# Patient Record
Sex: FEMALE | Race: BLACK OR AFRICAN AMERICAN | NOT HISPANIC OR LATINO | URBAN - METROPOLITAN AREA
[De-identification: names, ages, dates, MRNs, and addresses within clinical notes are randomized per-mention and may not be internally consistent; named-entity substitution may affect disease eponyms.]

---

## 2020-07-09 ENCOUNTER — OFFICE VISIT (OUTPATIENT)
Dept: URBAN - METROPOLITAN AREA CLINIC 86 | Facility: CLINIC | Age: 70
End: 2020-07-09

## 2022-09-09 ENCOUNTER — TELEPHONE ENCOUNTER (OUTPATIENT)
Dept: URBAN - METROPOLITAN AREA CLINIC 92 | Facility: CLINIC | Age: 72
End: 2022-09-09

## 2022-09-09 PROBLEM — 413467001 AFTERCARE: Status: ACTIVE | Noted: 2022-09-09

## 2022-09-09 NOTE — HPI-TODAY'S VISIT:
Patient is a 71-year-old female former patient of Dr. Gale and last seen by her on January 16, 2020 for fatty liver and now comes in to be seen. Patient at that time had lost some weight and had been cutting out breads and carbohydrates and doing Silver sneakers. Patient had a myocardial infarction and PCI in March 2019. Patient listed as having had hepatitis AMB immunizations. Weight was listed as being down from 216 pounds in June 2019 to 206 pounds in January 16, 2020. Per the notes the patient previously had hepatitis C and was biopsied in 2015 noted to have stage III fibrosis and 20% fat.  Was successfully treated with Harvoni at that timeframe. Patient also with history of AFP elevation in the past but no tumor seen on MRI in 2018 or subsequent ultrasound.  aFP had been ordered for follow-up then in June 2020. Patient also listed with diabetes history and seeing local providers. Patient taking aspirin Plavix and a statin for her coronary disease and had a percutaneous coronary convention in the past. Patient previously noted to be immune to hepatitis a as well as hepatitis B.  Patient wants to follow-up in 6 months but lost to follow-up. Normally follows with Dr. Basilia Ray for general care. January 2020 labs show glucose 121 BUN of 20 creatinine 0.93 sodium 141 potassium 4.2 calcium 10.1 albumin 4.1 bilirubin 0.3 alkaline phosphatase 48 AST 18 ALT of 12 white blood cell count 4.6 hemoglobin 13.6 plate count 359 MCV 84 INR 1.0 AFP normal at 6.3. January 16, 2020 labs showed liver within normal limits and no focal lesions.  Gallbladder absent.  Bile ducts not dilated.  Common bile duct 4 mm.  Image pancreas portions unremarkable.  Spleen not enlarged.  Right kidney 11.2 cm.  No ascites.  They felt the exam was within normal limits. June 8, 2018 MRI at shown heart mildly enlarged but no pleural or pericardial fusion.  Mild liver fat at 10% then.  No sequela chronic liver disease report hypertension seen.  Gallbladder/biliary tree, spleen, pancreas, adrenal glands, kidneys, lymph nodes and vessels were all normal.  Overall the patient was felt to have mild fat with no chronic liver disease. 1.  CASTRO history noted by biopsy 20% fat stage III fibrosis but also had hep C.  Hep C was treated and does not 15 with Harvoni.  Last MRI in 2018 not showing any signs of residual disease.  Last imaging January 2020 also being unremarkable.  Liver labs normal then.  Needs updated labs and imaging.  Patient has been losing weight last visit need to see how she is doing and monitor accordingly. 2.  Chronic hepatitis C history noted treated with Harvoni thousand 13 previously stage III fibrosis 20% fat and doing well there. 3.  Hepatic fibrosis stage III before but now appears to be resolved on same by MRI and ultrasound reported normal appearance.  Could consider FibroScan to assess. 4.  Vaccine counseling previously vaccine for hepatitis AMB and reported to be immune.  Would like to confirm same. 5.  Diabetes history noted follows with Dr. Basilia Ray for same.  Important to maintain same given the history of these other issues. 6.  Hypertension history noted and follows with primary provider regarding same. Plan 1.  _update labs. 2.  _update imaging. 3.  Encourage patient to continue to work on fatty liver risk factors.   Stressed to pt the need for social distancing and strict handwashing and wearing a mask and to follow any other new or added CDC recommendations as this is an evolving target.  Duration of the visit was 0 min with 20 min of chart prep reviewing data and infDuration of the visit was 0 minutes with 20 minutes of chart prep and 20 minutes by dox video and then by phone due to internet issues by phone for this TeleMed visit with time reviewing their prior and recent records and labs and discussing their current status and future plans for care for the patient.

## 2022-09-21 ENCOUNTER — LAB OUTSIDE AN ENCOUNTER (OUTPATIENT)
Dept: URBAN - METROPOLITAN AREA CLINIC 86 | Facility: CLINIC | Age: 72
End: 2022-09-21

## 2022-09-21 ENCOUNTER — OFFICE VISIT (OUTPATIENT)
Dept: URBAN - METROPOLITAN AREA CLINIC 86 | Facility: CLINIC | Age: 72
End: 2022-09-21
Payer: COMMERCIAL

## 2022-09-21 VITALS
DIASTOLIC BLOOD PRESSURE: 82 MMHG | BODY MASS INDEX: 35.68 KG/M2 | TEMPERATURE: 97.3 F | HEART RATE: 77 BPM | WEIGHT: 222 LBS | SYSTOLIC BLOOD PRESSURE: 153 MMHG | HEIGHT: 66 IN

## 2022-09-21 DIAGNOSIS — E66.9 OBESITY: ICD-10-CM

## 2022-09-21 DIAGNOSIS — B18.2 CHRONIC HEPATITIS C: ICD-10-CM

## 2022-09-21 DIAGNOSIS — Z95.5 HISTORY OF CORONARY ANGIOPLASTY WITH INSERTION OF STENT: ICD-10-CM

## 2022-09-21 DIAGNOSIS — E11.9 DIABETES: ICD-10-CM

## 2022-09-21 DIAGNOSIS — I10 HTN: ICD-10-CM

## 2022-09-21 DIAGNOSIS — Z71.89 VACCINE COUNSELING: ICD-10-CM

## 2022-09-21 DIAGNOSIS — K75.81 NASH (NONALCOHOLIC STEATOHEPATITIS): ICD-10-CM

## 2022-09-21 DIAGNOSIS — E78.5 HYPERLIPIDEMIA: ICD-10-CM

## 2022-09-21 DIAGNOSIS — K74.00 HEPATIC FIBROSIS: ICD-10-CM

## 2022-09-21 PROCEDURE — 99215 OFFICE O/P EST HI 40 MIN: CPT

## 2022-09-21 RX ORDER — ROSUVASTATIN CALCIUM 10 MG/1
1 TABLET TABLET, FILM COATED ORAL ONCE A DAY
Status: ACTIVE | COMMUNITY

## 2022-09-21 RX ORDER — METFORMIN HYDROCHLORIDE 500 MG/1
1 TABLET WITH A MEAL TABLET, FILM COATED ORAL BID
Status: ACTIVE | COMMUNITY

## 2022-09-21 RX ORDER — AMLODIPINE BESYLATE 5 MG/1
1 TABLET TABLET ORAL ONCE A DAY
Status: ACTIVE | COMMUNITY

## 2022-09-21 RX ORDER — LOSARTAN POTASSIUM 25 MG/1
1 TABLET TABLET ORAL ONCE A DAY
Status: ACTIVE | COMMUNITY

## 2022-09-21 RX ORDER — HYDROCHLOROTHIAZIDE 12.5 MG/1
1 TABLET IN THE MORNING TABLET ORAL ONCE A DAY
Refills: 0 | Status: ACTIVE | COMMUNITY
Start: 1900-01-01

## 2022-09-21 NOTE — EXAM-PHYSICAL EXAM
General: pleasant, well developed, well nourished, no acute distress, non ill appearing Eyes- no scleral icterus HENT: normocephalic, atraumatic head, face mask covering mouth and nose Neck: supple, no JVD Chest: normal breath sounds, normal work of breathing Heart: regular rate and rhythm without murmur Abdomen: soft, non tender, non distended, obese,  bowel sounds present, no hepatomegaly, no splenomegaly, no ascites Musculoskeletal: normal gait and station Extremities: no edema, no asterixis, mild palmar erythema Skin: no rashes, no jaundice Neurologic: no focal deficits, alert and oriented x 3 Psychiatric: stable mood, appropriate affect

## 2022-09-21 NOTE — HPI-TODAY'S VISIT:
Patient is a 71-year-old female former patient of Dr. Gale and prior Dr Gitlin and last seen by her on January 16, 2020 for fatty liver and now comes in to be seen.  I reveiwed the prior records that were available. At that time: patient at that time had lost some weight and had been cutting out breads and carbohydrates and doing Silver sneakers. Since the pandemic and stopped the exercise center for [facility] and did what could and gained weight. Knee pain led to less walking and gained from 207 and now to 227.  Patient had a myocardial infarction and PCI in March 2019. She is well with that.  Patient listed as having had hepatitis A/B immunizations.  Weight was listed as being down from 216 pounds in June 2019 to 206-207 pounds in January 16, 2020.  Per the notes the patient previously had hepatitis C and was biopsied in 2015 noted to have stage III fibrosis and 20% fat.  Was successfully treated with Harvoni at that timeframe.  Patient also with history of AFP elevation in the past but no tumor seen on MRI in 2018 or subsequent ultrasound.  aFP had been ordered for follow-up then in June 2020.  Patient also listed with diabetes history and seeing local providers. She says that dm is stable and last a1c 6.7%.  Patient taking aspirin, a statin for her coronary disease and had a percutaneous coronary convention in the past.  Patient wants to follow-up in 6 months but lost to follow-up due to pandemic.  Normally follows with Dr. Basilia Ray for general care.  January 2020 labs show glucose 121 BUN of 20 creatinine 0.93 sodium 141 potassium 4.2 calcium 10.1 albumin 4.1 bilirubin 0.3 alkaline phosphatase 48 AST 18 ALT of 12 white blood cell count 4.6 hemoglobin 13.6 plate count 359 MCV 84 INR 1.0 AFP normal at 6.3.  January 16, 2020 labs showed liver within normal limits and no focal lesions.  Gallbladder absent.  Bile ducts not dilated.  Common bile duct 4 mm.  Image pancreas portions unremarkable.  Spleen not enlarged.  Right kidney 11.2 cm.  No ascites.  They felt the exam was within normal limits.  June 8, 2018 MRI at shown heart mildly enlarged but no pleural or pericardial fusion.  Mild liver fat at 10% then.  No sequela chronic liver disease report hypertension seen.  Gallbladder/biliary tree, spleen, pancreas, adrenal glands, kidneys, lymph nodes and vessels were all normal.  Overall the patient was felt to have mild fat with no chronic liver disease.  Right now behind on labs and imaging and need to get caught. She may need [facility] dietitician and get counseling for dm and weight loss and get back on track as suspect some of gains of 2020 will be loss.  Plan 1.  We will update labs. 2.  We will need to update imaging. 3.  Encourage patient to continue to work on fatty liver risk factors with Dr Ray. 4. RTC in 3 m to reassess status.   Stressed to pt the need for social distancing and strict handwashing and wearing a mask and to follow any other new or added CDC recommendations as this is an evolving target.   Duration of the visit was 45 min with 20 min of chart prep reviewing data and then 25 minutes for this face to face visit with time reviewing their prior and recent records and labs and discussing their current status and future plans for care for the patient.

## 2022-09-23 ENCOUNTER — OFFICE VISIT (OUTPATIENT)
Dept: URBAN - METROPOLITAN AREA CLINIC 91 | Facility: CLINIC | Age: 72
End: 2022-09-23
Payer: COMMERCIAL

## 2022-09-23 ENCOUNTER — TELEPHONE ENCOUNTER (OUTPATIENT)
Dept: URBAN - METROPOLITAN AREA CLINIC 92 | Facility: CLINIC | Age: 72
End: 2022-09-23

## 2022-09-23 DIAGNOSIS — K75.81 NASH (NONALCOHOLIC STEATOHEPATITIS): ICD-10-CM

## 2022-09-23 LAB
A/G RATIO: 1.2
ABSOLUTE BASOPHILS: 29
ABSOLUTE EOSINOPHILS: 99
ABSOLUTE LYMPHOCYTES: 1676
ABSOLUTE MONOCYTES: 331
ABSOLUTE NEUTROPHILS: 3666
ALBUMIN: 3.8
ALKALINE PHOSPHATASE: 67
ALT (SGPT): 8
AST (SGOT): 10
BASOPHILS: 0.5
BILIRUBIN, TOTAL: 0.3
BUN/CREATININE RATIO: (no result)
BUN: 18
CALCIUM: 9.6
CARBON DIOXIDE, TOTAL: 25
CHLORIDE: 108
CREATININE: 0.67
EGFR: 93
EOSINOPHILS: 1.7
GLOBULIN, TOTAL: 3.2
GLUCOSE: 127
HCV RNA, QUANTITATIVE: <1.18
HCV RNA, QUANTITATIVE: <15
HEMATOCRIT: 41.5
HEMOGLOBIN: 13.6
HEP A AB, IGM: (no result)
HEPATITIS A AB, TOTAL: REACTIVE
HEPATITIS B SURFACE AB IMMUNITY, QN: 63
LYMPHOCYTES: 28.9
MCH: 27.3
MCHC: 32.8
MCV: 83.2
MONOCYTES: 5.7
MPV: 10.3
NEUTROPHILS: 63.2
PLATELET COUNT: 337
POTASSIUM: 3.8
PROTEIN, TOTAL: 7
RDW: 14
RED BLOOD CELL COUNT: 4.99
SODIUM: 143
WHITE BLOOD CELL COUNT: 5.8

## 2022-09-23 PROCEDURE — 76705 ECHO EXAM OF ABDOMEN: CPT

## 2022-09-23 PROCEDURE — 93975 VASCULAR STUDY: CPT

## 2022-09-23 NOTE — HPI-TODAY'S VISIT:
Dear Kendy Lee, September 21 labs show hepatitis A IgM nonreactive.  Hepatitis A total immunity was seen indicating that this immunity was not due to a recent exposure or vaccination related event. Do you recall getting the vaccine in the past? You also do have immunity to hepatitis B at a level of 63 so that is good to note. Hepatitis C PCR less than 15 and not mentioned as being detected. Glucose was 127 slightly up and unclear if you were fasting this day or not?  BUN of 18 creatinine 0.67 sodium 143 potassium 3.8 calcium 9.6 albumin 3.8 total bilirubin 0.3 alkaline phosphatase 67 AST of 10 and ALT of 8.  Ideal ALT less than 25. White blood cell count 5.8 hemoglobin 13.6 platelet count 337 and MCV 83.2 neutrophils 3666 and lymphocytes 1676. These labs confirm that your hepatitis C remains in remission. Your liver labs also are remaining at ideal. We await the updated imaging. The labs also confirmed that you are hepatitis A as well as hepatitis B immune at this timeframe and that is good to note. Dr. Sarmiento

## 2022-09-24 ENCOUNTER — TELEPHONE ENCOUNTER (OUTPATIENT)
Dept: URBAN - METROPOLITAN AREA CLINIC 92 | Facility: CLINIC | Age: 72
End: 2022-09-24

## 2022-09-24 NOTE — HPI-TODAY'S VISIT:
Dear Kendy Lee, Sept 23 u.s:  pancreas unremarkable. Liver somewhat echogenic with no discrete liver lesion. Common bile duct was 7 mm which is upper normal. Liver vessels were patent. Right kidney 11.3 cm with no hydronephrosis. Spleen normal at 8.1 cm. They overall felt that the liver was somewhat echogenic and possibly mildy fatty infiltrated. As you recall from your visit, on one of your last scans in 2020 it had showed the liver then was within normal limits but the MRI in 2018 had suggested 10% fat being present. You also mentioned at the visit that you had had some issues with weight gain with the pandemic. We anticipate that as that gets back on track, that this should also improve. We will plan to repeat the imaging again in 6 months and reassess. Dr. Sarmiento

## 2022-12-07 ENCOUNTER — DASHBOARD ENCOUNTERS (OUTPATIENT)
Age: 72
End: 2022-12-07

## 2022-12-07 ENCOUNTER — WEB ENCOUNTER (OUTPATIENT)
Dept: URBAN - METROPOLITAN AREA CLINIC 86 | Facility: CLINIC | Age: 72
End: 2022-12-07

## 2022-12-07 ENCOUNTER — OFFICE VISIT (OUTPATIENT)
Dept: URBAN - METROPOLITAN AREA CLINIC 86 | Facility: CLINIC | Age: 72
End: 2022-12-07
Payer: COMMERCIAL

## 2022-12-07 ENCOUNTER — LAB OUTSIDE AN ENCOUNTER (OUTPATIENT)
Dept: URBAN - METROPOLITAN AREA CLINIC 86 | Facility: CLINIC | Age: 72
End: 2022-12-07

## 2022-12-07 VITALS
HEART RATE: 65 BPM | HEIGHT: 66 IN | BODY MASS INDEX: 36.24 KG/M2 | SYSTOLIC BLOOD PRESSURE: 150 MMHG | DIASTOLIC BLOOD PRESSURE: 84 MMHG | TEMPERATURE: 98.2 F | WEIGHT: 225.5 LBS

## 2022-12-07 DIAGNOSIS — Z95.5 HISTORY OF CORONARY ANGIOPLASTY WITH INSERTION OF STENT: ICD-10-CM

## 2022-12-07 DIAGNOSIS — E66.9 OBESITY: ICD-10-CM

## 2022-12-07 DIAGNOSIS — K74.00 HEPATIC FIBROSIS: ICD-10-CM

## 2022-12-07 DIAGNOSIS — K75.81 NASH (NONALCOHOLIC STEATOHEPATITIS): ICD-10-CM

## 2022-12-07 DIAGNOSIS — E11.9 DIABETES: ICD-10-CM

## 2022-12-07 DIAGNOSIS — B18.2 CHRONIC HEPATITIS C: ICD-10-CM

## 2022-12-07 DIAGNOSIS — I10 HTN: ICD-10-CM

## 2022-12-07 DIAGNOSIS — E78.5 HYPERLIPIDEMIA: ICD-10-CM

## 2022-12-07 DIAGNOSIS — Z71.89 VACCINE COUNSELING: ICD-10-CM

## 2022-12-07 PROBLEM — 55822004 HYPERLIPIDEMIA: Status: ACTIVE | Noted: 2022-09-21

## 2022-12-07 PROBLEM — 38341003 HYPERTENSION: Status: ACTIVE | Noted: 2022-09-09

## 2022-12-07 PROBLEM — 128302006 CHRONIC HEPATITIS C: Status: ACTIVE | Noted: 2022-09-09

## 2022-12-07 PROBLEM — 414916001 OBESITY: Status: ACTIVE | Noted: 2022-09-21

## 2022-12-07 PROBLEM — 373108000 POST PERCUTANEOUS TRANSLUMINAL CORONARY ANGIOPLASTY: Status: ACTIVE | Noted: 2022-09-21

## 2022-12-07 PROBLEM — 442685003 NASH - NONALCOHOLIC STEATOHEPATITIS: Status: ACTIVE | Noted: 2022-09-09

## 2022-12-07 PROBLEM — 111552007 DIABETES MELLITUS WITHOUT COMPLICATION: Status: ACTIVE | Noted: 2022-09-09

## 2022-12-07 PROBLEM — 409063005 COUNSELING: Status: ACTIVE | Noted: 2022-09-09

## 2022-12-07 PROBLEM — 62484002 HEPATIC FIBROSIS: Status: ACTIVE | Noted: 2022-09-09

## 2022-12-07 PROCEDURE — 99214 OFFICE O/P EST MOD 30 MIN: CPT

## 2022-12-07 RX ORDER — METFORMIN HYDROCHLORIDE 500 MG/1
1 TABLET WITH A MEAL TABLET, FILM COATED ORAL BID
Status: ACTIVE | COMMUNITY

## 2022-12-07 RX ORDER — HYDROCHLOROTHIAZIDE 12.5 MG/1
1 TABLET IN THE MORNING TABLET ORAL ONCE A DAY
Refills: 0 | Status: ACTIVE | COMMUNITY
Start: 1900-01-01

## 2022-12-07 RX ORDER — ROSUVASTATIN CALCIUM 10 MG/1
1 TABLET TABLET, FILM COATED ORAL ONCE A DAY
Status: ACTIVE | COMMUNITY

## 2022-12-07 RX ORDER — AMLODIPINE BESYLATE 5 MG/1
1 TABLET TABLET ORAL ONCE A DAY
Status: ACTIVE | COMMUNITY

## 2022-12-07 RX ORDER — LOSARTAN POTASSIUM 25 MG/1
1 TABLET TABLET ORAL ONCE A DAY
Status: ACTIVE | COMMUNITY

## 2022-12-07 NOTE — HPI-TODAY'S VISIT:
Patient is a 72-year-old female former patient of Dr. Gale and prior Dr Gitlin and last seen sept 2022 for fatty liver and prior hcv tx with Marty and now comes in to be seen.  I reveiwed the prior records that were available.  Sept 23 u.s:  pancreas unremarkable. Liver somewhat echogenic with no discrete liver lesion. Common bile duct was 7 mm which is upper normal. Liver vessels were patent. Right kidney 11.3 cm with no hydronephrosis. Spleen normal at 8.1 cm. They overall felt that the liver was somewhat echogenic and possibly mildy fatty infiltrated.  As you recall from your visit, on one of your last scans in 2020 it had showed the liver then was within normal limits but the MRI in 2018 had suggested 10% fat being present.  She had mentioned at the visit that you had had some issues with weight gain with the pandemic. She thinks she has gained some with pandemic. Maybe 10.  Losing 5-10% of weight will help with the fatty liver.  September 21 labs show hepatitis A IgM nonreactive.  Hepatitis A total immunity was seen indicating that this immunity was not due to a recent exposure or vaccination related event. Do you recall getting the vaccine in the past and asked she does not recall. You also do have immunity to hepatitis B at a level of 63 so that is good to note. She ws vaccinated for that. Hepatitis C PCR less than 15 and not mentioned as being detected. Glucose was 127 slightly up and unclear if you were fasting this day or not?  BUN of 18 creatinine 0.67 sodium 143 potassium 3.8 calcium 9.6 albumin 3.8 total bilirubin 0.3 alkaline phosphatase 67 AST of 10 and ALT of 8.  Ideal ALT less than 25. White blood cell count 5.8 hemoglobin 13.6 platelet count 337 and MCV 83.2 neutrophils 3666 and lymphocytes 1676. These labs confirm that your hepatitis C remains in remission.  At that prior visit  patient at that time had lost some weight and had been cutting out breads and carbohydrates and doing Silver sneakers. She says she stopped the program and trying to do that.  Knee pain led to less walking and gained from 207 and to 227 and to 225.  Patient had a myocardial infarction and PCI in March 2019. She is well with that.  Patient per chart was listed as being vaccinated for hepatitis A/B immunizations.  June 2019 to 206-207 pounds in January 16, 2020. Really gained 20 or so with the pandemic.  Per the notes the patient previously had hepatitis C and was biopsied in 2015 noted to have stage III fibrosis and 20% fat.  Was successfully treated with Harvoni at that timeframe.  Patient also with history of AFP elevation in the past but no tumor seen on MRI in 2018 or subsequent ultrasound.  aFP had been ordered for follow-up then in June 2020.  Patient also listed with diabetes history and seeing local providers. She says that dm is stable and last a1c 6.7%. She says not rechecked.  Patient taking aspirin, a statin for her coronary disease and had a percutaneous coronary convention in the past.  Pt had been lpost in pandemic.  Normally follows with Dr. Basilia Ray for general care and is seeing her.  January 2020 labs show glucose 121 BUN of 20 creatinine 0.93 sodium 141 potassium 4.2 calcium 10.1 albumin 4.1 bilirubin 0.3 alkaline phosphatase 48 AST 18 ALT of 12 white blood cell count 4.6 hemoglobin 13.6 plate count 359 MCV 84 INR 1.0 AFP normal at 6.3.  January 16, 2020 labs showed liver within normal limits and no focal lesions.  Gallbladder absent.  Bile ducts not dilated.  Common bile duct 4 mm.  Image pancreas portions unremarkable.  Spleen not enlarged.  Right kidney 11.2 cm.  No ascites.  They felt the exam was within normal limits.  June 8, 2018 MRI at shown heart mildly enlarged but no pleural or pericardial fusion.  Mild liver fat at 10% then.  No sequela chronic liver disease report hypertension seen.  Gallbladder/biliary tree, spleen, pancreas, adrenal glands, kidneys, lymph nodes and vessels were all normal.  Overall the patient was felt to have mild fat with no chronic liver disease.  Right now behind on labs and imaging and need to get caught. She may need [facility] dietitician and get counseling for dm and weight loss and get back on track as suspect some of gains of 2020 will be loss.  Plan 1.  We will update labs. 2.  We need to work on losing the 20 pounds,. 3.  Pt will see us in march and do the u,s in march. 4. She will call if issues.  Stressed to pt the need for social distancing and strict handwashing and wearing a mask and to follow any other new or added CDC recommendations as this is an evolving target.   Duration of the visit was 35 min with 10 min of chart prep reviewing data and then 25 minutes for this face to face visit with time reviewing their prior and recent records and labs and discussing their current status and future plans for care for the patient.

## 2022-12-08 ENCOUNTER — TELEPHONE ENCOUNTER (OUTPATIENT)
Dept: URBAN - METROPOLITAN AREA CLINIC 92 | Facility: CLINIC | Age: 72
End: 2022-12-08

## 2022-12-08 LAB
ALBUMIN/GLOBULIN RATIO: 1.2
ALBUMIN: 3.8
ALKALINE PHOSPHATASE: 73
ALT (SGPT): 8
AST (SGOT): 10
BILIRUBIN, DIRECT: 0.1
BILIRUBIN, INDIRECT: 0.2
BILIRUBIN, TOTAL: 0.3
GLOBULIN: 3.3
PROTEIN, TOTAL: 7.1

## 2023-03-01 ENCOUNTER — LAB OUTSIDE AN ENCOUNTER (OUTPATIENT)
Dept: URBAN - METROPOLITAN AREA CLINIC 86 | Facility: CLINIC | Age: 73
End: 2023-03-01

## 2023-03-07 ENCOUNTER — LAB OUTSIDE AN ENCOUNTER (OUTPATIENT)
Dept: URBAN - METROPOLITAN AREA CLINIC 86 | Facility: CLINIC | Age: 73
End: 2023-03-07

## 2023-03-16 ENCOUNTER — OFFICE VISIT (OUTPATIENT)
Dept: URBAN - METROPOLITAN AREA CLINIC 86 | Facility: CLINIC | Age: 73
End: 2023-03-16

## 2023-03-16 NOTE — HPI-TODAY'S VISIT:
Patient is a 72-year-old female former patient of Dr. Gale and prior Dr Gitlin and last seen Dec 2022 for fatty liver and prior hcv tx with Marty and now comes in to be seen.  I reveiwed the prior records that were available.  Dear Kendy Lee, December 7 labs show total protein normal at 7.1 albumin normal at 3.8 bilirubin 0.3 direct bili 0.1 alkaline phosphatase 73 AST of 10 ALT of 8 with ideal ALT less than 25. As you recall, prior September labs had shown an AST of 10 and ALT of 8 and an alkaline phosphatase of 67 so the laboratories continue to be stable. Dr. Sarmiento  Sept 23 u.s:  pancreas unremarkable. Liver somewhat echogenic with no discrete liver lesion. Common bile duct was 7 mm which is upper normal. Liver vessels were patent. Right kidney 11.3 cm with no hydronephrosis. Spleen normal at 8.1 cm. They overall felt that the liver was somewhat echogenic and possibly mildy fatty infiltrated.  As you recall from your visit, on one of your last scans in 2020 it had showed the liver then was within normal limits but the MRI in 2018 had suggested 10% fat being present.  She had mentioned at the visit that you had had some issues with weight gain with the pandemic. She thinks she has gained some with pandemic. Maybe 10.  Losing 5-10% of weight will help with the fatty liver.  September 21 labs show hepatitis A IgM nonreactive.  Hepatitis A total immunity was seen indicating that this immunity was not due to a recent exposure or vaccination related event. Do you recall getting the vaccine in the past and asked she does not recall. You also do have immunity to hepatitis B at a level of 63 so that is good to note. She ws vaccinated for that. Hepatitis C PCR less than 15 and not mentioned as being detected. Glucose was 127 slightly up and unclear if you were fasting this day or not?  BUN of 18 creatinine 0.67 sodium 143 potassium 3.8 calcium 9.6 albumin 3.8 total bilirubin 0.3 alkaline phosphatase 67 AST of 10 and ALT of 8.  Ideal ALT less than 25. White blood cell count 5.8 hemoglobin 13.6 platelet count 337 and MCV 83.2 neutrophils 3666 and lymphocytes 1676. These labs confirm that your hepatitis C remains in remission.  At that prior visit  patient at that time had lost some weight and had been cutting out breads and carbohydrates and doing Silver sneakers. She says she stopped the program and trying to do that.  Knee pain led to less walking and gained from 207 and to 227 and to 225.  Patient had a myocardial infarction and PCI in March 2019. She is well with that.  Patient per chart was listed as being vaccinated for hepatitis A/B immunizations.  June 2019 to 206-207 pounds in January 16, 2020. Really gained 20 or so with the pandemic.  Per the notes the patient previously had hepatitis C and was biopsied in 2015 noted to have stage III fibrosis and 20% fat.  Was successfully treated with Harvoni at that timeframe.  Patient also with history of AFP elevation in the past but no tumor seen on MRI in 2018 or subsequent ultrasound.  aFP had been ordered for follow-up then in June 2020.  Patient also listed with diabetes history and seeing local providers. She says that dm is stable and last a1c 6.7%. She says not rechecked.  Patient taking aspirin, a statin for her coronary disease and had a percutaneous coronary convention in the past.  Pt had been lpost in pandemic.  Normally follows with Dr. Basilia Ray for general care and is seeing her.  January 2020 labs show glucose 121 BUN of 20 creatinine 0.93 sodium 141 potassium 4.2 calcium 10.1 albumin 4.1 bilirubin 0.3 alkaline phosphatase 48 AST 18 ALT of 12 white blood cell count 4.6 hemoglobin 13.6 plate count 359 MCV 84 INR 1.0 AFP normal at 6.3.  January 16, 2020 labs showed liver within normal limits and no focal lesions.  Gallbladder absent.  Bile ducts not dilated.  Common bile duct 4 mm.  Image pancreas portions unremarkable.  Spleen not enlarged.  Right kidney 11.2 cm.  No ascites.  They felt the exam was within normal limits.  June 8, 2018 MRI at shown heart mildly enlarged but no pleural or pericardial fusion.  Mild liver fat at 10% then.  No sequela chronic liver disease report hypertension seen.  Gallbladder/biliary tree, spleen, pancreas, adrenal glands, kidneys, lymph nodes and vessels were all normal.  Overall the patient was felt to have mild fat with no chronic liver disease.  Right now behind on labs and imaging and need to get caught. She may need [facility] dietitician and get counseling for dm and weight loss and get back on track as suspect some of gains of 2020 will be loss.  Plan 1.  We will update labs. 2.  We need to work on losing the 20 pounds,. 3.  Pt will see us in march and do the u,s in march. 4. She will call if issues.  Stressed to pt the need for social distancing and strict handwashing and wearing a mask and to follow any other new or added CDC recommendations as this is an evolving target.   Duration of the visit was  min with 10 min of chart prep reviewing data and then 20 minutes for this face to face visit with time reviewing their prior and recent records and labs and discussing their current status and future plans for care for the patient.

## 2023-04-18 ENCOUNTER — OFFICE VISIT (OUTPATIENT)
Dept: URBAN - METROPOLITAN AREA CLINIC 86 | Facility: CLINIC | Age: 73
End: 2023-04-18